# Patient Record
Sex: FEMALE | Race: WHITE | NOT HISPANIC OR LATINO | URBAN - METROPOLITAN AREA
[De-identification: names, ages, dates, MRNs, and addresses within clinical notes are randomized per-mention and may not be internally consistent; named-entity substitution may affect disease eponyms.]

---

## 2018-09-18 ENCOUNTER — VIRTUAL VISIT (OUTPATIENT)
Dept: FAMILY MEDICINE | Facility: OTHER | Age: 28
End: 2018-09-18

## 2018-09-19 NOTE — PROGRESS NOTES
"Date:   Clinician: Dashawn Bill  Clinician NPI: [id number]  Patient: Tanya Ruzicka  Patient : 1990  Patient Address: [facility] [address], [place] [address]  Patient Phone: [phone]  Visit Protocol: URI  Patient Summary:  Katie is a 28 year old ( : 1990 ) female who initiated a Visit for cold, sinus infection, or influenza. When asked the question \"Please sign me up to receive news, health information and promotions from [facility].\", Katie responded \"No\".    Katie states her symptoms started gradually 2-3 weeks ago.   Her symptoms consist of myalgia, facial pain or pressure, chills, a cough, rhinitis, malaise, nasal congestion, a sore throat, a headache, and tooth pain. Katie also feels feverish but was unable to measure her temperature.   Symptom details     Nasal secretions: The color of her mucus is yellow.    Cough: Katie coughs every 5-10 minutes and her cough is more bothersome at night. Phlegm comes into her throat when she coughs. She believes the phlegm causes the cough. The color of the phlegm is yellow.     Sore throat: Katie reports having mild throat pain (between 1-3 on a 10 point pain scale), does not have exudate on her tonsils, and can swallow liquids. She is not sure if the lymph nodes in her neck are enlarged. A rash has not appeared on the skin since the sore throat started.     Facial pain or pressure: The facial pain or pressure feels worse when bending over or leaning forward.     Headache: She states the headache is moderate (between 4-6 on a 10 point pain scale).     Tooth pain: The tooth pain is not caused by a cavity, recent dental work, or other mouth problems.      Katie denies having wheezing, dyspnea, and ear pain. She also denies having recent facial or sinus surgery in the past 60 days, taking antibiotic medication for the symptoms, and double sickening (worsening symptoms after initial improvement).   Within the past week, Katie has not been exposed " to someone with strep throat. She has not recently been exposed to someone with influenza. Katie has been in close contact with the following high risk individuals: children under the age of 5.   Katie had 1 sinus infection within the past year.   Weight: 200 lbs   Katie smokes or uses smokeless tobacco.   She denies pregnancy and denies breastfeeding. She does not menstruate.   MEDICATIONS: No current medications, ALLERGIES: NKDA  Clinician Response:  Dear Katie,  I am sorry you are not feeling well. To determine the most appropriate care for you, I would like you to be seen in person to further discuss your health history and symptoms.  You will not be charged for this Visit. Thank you for trusting us with your care.   Diagnosis: Refer for additional evaluation  Diagnosis ICD: R69  Additional Clinician Notes: Unfortunately the system shows you were requested to be seen for your illness twice today. Please come into one of our clinics for evaluation.